# Patient Record
Sex: FEMALE | Race: WHITE | HISPANIC OR LATINO | URBAN - METROPOLITAN AREA
[De-identification: names, ages, dates, MRNs, and addresses within clinical notes are randomized per-mention and may not be internally consistent; named-entity substitution may affect disease eponyms.]

---

## 2024-09-12 ENCOUNTER — HOSPITAL ENCOUNTER (EMERGENCY)
Facility: HOSPITAL | Age: 19
Discharge: HOME | End: 2024-09-12
Payer: COMMERCIAL

## 2024-09-12 VITALS
HEART RATE: 78 BPM | RESPIRATION RATE: 17 BRPM | SYSTOLIC BLOOD PRESSURE: 136 MMHG | TEMPERATURE: 98.7 F | OXYGEN SATURATION: 100 % | WEIGHT: 134.7 LBS | DIASTOLIC BLOOD PRESSURE: 79 MMHG | BODY MASS INDEX: 21.14 KG/M2 | HEIGHT: 67 IN

## 2024-09-12 DIAGNOSIS — N75.0 BARTHOLIN CYST: Primary | ICD-10-CM

## 2024-09-12 PROCEDURE — 99284 EMERGENCY DEPT VISIT MOD MDM: CPT

## 2024-09-12 PROCEDURE — 2500000001 HC RX 250 WO HCPCS SELF ADMINISTERED DRUGS (ALT 637 FOR MEDICARE OP): Performed by: PHYSICIAN ASSISTANT

## 2024-09-12 RX ORDER — CEPHALEXIN 500 MG/1
500 CAPSULE ORAL 2 TIMES DAILY
Qty: 14 CAPSULE | Refills: 0 | Status: SHIPPED | OUTPATIENT
Start: 2024-09-12 | End: 2024-09-19

## 2024-09-12 RX ORDER — CEPHALEXIN 500 MG/1
500 CAPSULE ORAL ONCE
Status: COMPLETED | OUTPATIENT
Start: 2024-09-12 | End: 2024-09-12

## 2024-09-12 RX ADMIN — CEPHALEXIN 500 MG: 500 CAPSULE ORAL at 22:54

## 2024-09-12 ASSESSMENT — PAIN DESCRIPTION - LOCATION: LOCATION: VAGINA

## 2024-09-12 ASSESSMENT — PAIN DESCRIPTION - ORIENTATION: ORIENTATION: LEFT

## 2024-09-12 ASSESSMENT — PAIN DESCRIPTION - FREQUENCY: FREQUENCY: CONSTANT/CONTINUOUS

## 2024-09-12 ASSESSMENT — PAIN - FUNCTIONAL ASSESSMENT: PAIN_FUNCTIONAL_ASSESSMENT: 0-10

## 2024-09-12 ASSESSMENT — COLUMBIA-SUICIDE SEVERITY RATING SCALE - C-SSRS
6. HAVE YOU EVER DONE ANYTHING, STARTED TO DO ANYTHING, OR PREPARED TO DO ANYTHING TO END YOUR LIFE?: NO
1. IN THE PAST MONTH, HAVE YOU WISHED YOU WERE DEAD OR WISHED YOU COULD GO TO SLEEP AND NOT WAKE UP?: NO
2. HAVE YOU ACTUALLY HAD ANY THOUGHTS OF KILLING YOURSELF?: NO

## 2024-09-12 ASSESSMENT — PAIN DESCRIPTION - DESCRIPTORS: DESCRIPTORS: SHARP

## 2024-09-12 ASSESSMENT — PAIN DESCRIPTION - PROGRESSION: CLINICAL_PROGRESSION: NOT CHANGED

## 2024-09-12 ASSESSMENT — PAIN DESCRIPTION - ONSET: ONSET: GRADUAL

## 2024-09-12 ASSESSMENT — PAIN DESCRIPTION - PAIN TYPE: TYPE: ACUTE PAIN

## 2024-09-12 ASSESSMENT — PAIN SCALES - GENERAL: PAINLEVEL_OUTOF10: 7

## 2024-09-12 NOTE — Clinical Note
Ryan Ramos was seen and treated in our emergency department on 9/12/2024.  She may return to work on 09/13/2024.       If you have any questions or concerns, please don't hesitate to call.      Herminio Sin PA-C

## 2024-09-13 NOTE — CONSULTS
"Reason For Consult  Bartholin's cyst    History Of Present Illness  Ryan Ramos is a 18 y.o. female presenting with bartholin's cyst on left side.  Pt reports h/o bartholin's cyst that swells up every month for about 2 years now.  Has h/o marsupialization on it awhile ago, but it stopped draining and now swells again.  Pt has never had I&D or word catheter of it as she is terrified of needles.  She lives in another area of [place] but is up in this area for awhile with a friend.     Past Medical History  She has no past medical history on file.    Surgical History  Marsupialization of left bartholin's gland     Social History  She has no history on file for tobacco use, alcohol use, and drug use.    Family History  No family history on file.     Allergies  Patient has no known allergies.    Review of Systems  negative     Physical Exam  Gen NAD  VE--enlarged labia minora on left side approximately 2 cm by 3 cm in size c/w bartholin's gland abscess, soft, no fluctuation, nontender to palpation, no erythema, scar visible from likely past marsupialization. No other lesions present     Last Recorded Vitals  Blood pressure 136/79, pulse 78, temperature 37.1 °C (98.7 °F), temperature source Oral, resp. rate 17, height 1.702 m (5' 7\"), weight 61.1 kg (134 lb 11.2 oz), SpO2 100%.         Assessment/Plan     D/w pt can consider repeat marsupialization or I&D, however since improving in size now and nontender plan for conservative treatment with antibiotics and sitz baths.  Pt to follow up in office in 3-4 days to ensure improvement in symptoms. Pt voices understanding and agreement in plan of care    I spent 20 minutes in the professional and overall care of this patient.      Juliane Moser MD    "

## 2024-09-13 NOTE — ED PROVIDER NOTES
HPI   Chief Complaint   Patient presents with    Cyst     Cyst on L labia x 6 days. Pt complains of 7/10 pain agitated when walking. Pt states she has had cyst in same spot before. Pt is aox4, stable. Pt complains of redness in area and states she feels like someone is pinching it.       HPI  Patient is a 18-year-old female here for a possible Bartholin cyst.  Patient states that for the last 6 days it has been growing, has had previous surgical interventions on this in the past.  She states that it has becoming more painful.  No fevers or chills.      Patient History   History reviewed. No pertinent past medical history.  History reviewed. No pertinent surgical history.  No family history on file.  Social History     Tobacco Use    Smoking status: Not on file    Smokeless tobacco: Not on file   Substance Use Topics    Alcohol use: Not on file    Drug use: Not on file       Physical Exam   ED Triage Vitals [09/12/24 2153]   Temperature Heart Rate Respirations BP   37.1 °C (98.7 °F) 78 17 136/79      Pulse Ox Temp Source Heart Rate Source Patient Position   100 % Oral Monitor Sitting      BP Location FiO2 (%)     Right arm --       Physical Exam  GENERAL APPEARANCE: This patient is in no acute respiratory distress. Awake and alert.talking appropriately. Answering questions appropriately. No evidence of pressured speech     VITAL SIGNS: As per the nurses' triage record.     HEENT: Normocephalic, atraumatic.     NECK:  full gross ROM during exam    MUSCULOSKELETAL:  Full gross active range of motion. Ambulating on own with no acute difficulties    Genitourinary: My examination was performed with female nurse at bedside as chaperone and assistant.  There is what appears to be a Bartholin cyst to the left labia.  Proximately 4 cm long.  No current discharge.     NEUROLOGICAL: Awake, alert and oriented x 3.    IMMUNOLOGICAL: No palpable lymphadenopathy or lymphatic streaking noted on visible skin.    DERM: No petechiae,  rashes, or ecchymoses. on visible skin    PSYCH: mood and affect appear normal.      ED Course & MDM   ED Course as of 09/12/24 2243   Thu Sep 12, 2024   2214 Spoke to OB/GYN on-call Dr. Moser -she is coming down to evaluate the patient. [AP]   2241 Dr bragg came and saw pt, requesting Keflex twice daily for 1 week and she will see the patient in her office.  She has indicated no need for incision and drainage at this time [AP]      ED Course User Index  [AP] Herminio Sin PA-C         Diagnoses as of 09/12/24 2243   Bartholin cyst                 No data recorded                                 Medical Decision Making  Parts of this chart have been completed using voice recognition software. Please excuse any errors of transcription.  My thought process and reason for plan has been formulated from the time that I saw the patient until the time of disposition and is not specific to one specific moment during their visit and furthermore my MDM encompasses this entire chart and not only this text box.      HPI: Detailed above.    Exam: A medically appropriate exam performed, outlined above, given the known history and presentation.    History Limited by: Nothing    History obtained from: Patient    External/internal records reviewed: No external records available for review    Social Determinants of Health considered during this visit: Lives at home    Chronic conditions impacting care: Denies    Medications given during visit:  Medications   cephalexin (Keflex) capsule 500 mg (has no administration in time range)        Diagnostic/tests  Labs Reviewed - No data to display   No orders to display          Case discussed with: OB/GYN on-call Dr. Moser      Considerations/further MDM:  Differential includes cellulitis, abscess, foreign object, the patient has a Bartholin cyst, came down and evaluated by the OB/GYN in real-time, she is suggesting discharge specifically with Keflex twice daily for 7 days.   The patient will be seen and followed by her in her office.  No I&D suggested at this time.    Patient feels comfortable with home-going plan, the patient has been given return precautions and follow-up instructions.      Procedure  Procedures     Herminio Sin PA-C  09/12/24 3129

## 2024-09-13 NOTE — DISCHARGE INSTRUCTIONS
Be sure to follow up as directed in 1-2 days.  All of the details of your follow up instructions are detailed in the follow up section of this packet.     Follow-up with OB/GYN as discussed.    It is important to remember that your care does not end here and you must continue to monitor your condition closely. Please return to the emergency department for any worsening or concerning signs or symptoms as directed by our conversations and the discharge instructions. Otherwise please follow up with your doctor in 2 days if no better or worse. If you do not have a doctor please contact the referral number on your discharge instructions. Please contact any physician specialists provided in your discharge notes as it is very important to follow up with them regarding your condition. If you are unable to reach the physicians provided, please come back to the Emergency Department at any time.        Return to emergency room without delay for ANY new or worsening pains or for any other symptoms or concerns.